# Patient Record
Sex: MALE | Race: ASIAN | NOT HISPANIC OR LATINO | Employment: FULL TIME | URBAN - METROPOLITAN AREA
[De-identification: names, ages, dates, MRNs, and addresses within clinical notes are randomized per-mention and may not be internally consistent; named-entity substitution may affect disease eponyms.]

---

## 2024-04-08 ENCOUNTER — OFFICE VISIT (OUTPATIENT)
Dept: INTERNAL MEDICINE CLINIC | Facility: CLINIC | Age: 54
End: 2024-04-08
Payer: COMMERCIAL

## 2024-04-08 VITALS
WEIGHT: 154 LBS | SYSTOLIC BLOOD PRESSURE: 104 MMHG | RESPIRATION RATE: 18 BRPM | HEART RATE: 67 BPM | DIASTOLIC BLOOD PRESSURE: 68 MMHG | BODY MASS INDEX: 27.29 KG/M2 | TEMPERATURE: 98.1 F | OXYGEN SATURATION: 99 % | HEIGHT: 63 IN

## 2024-04-08 DIAGNOSIS — E78.00 ELEVATED LDL CHOLESTEROL LEVEL: ICD-10-CM

## 2024-04-08 DIAGNOSIS — Z79.899 HIGH RISK MEDICATION USE: ICD-10-CM

## 2024-04-08 DIAGNOSIS — Z00.00 VISIT FOR PREVENTIVE HEALTH EXAMINATION: Primary | ICD-10-CM

## 2024-04-08 DIAGNOSIS — Z12.5 SCREENING PSA (PROSTATE SPECIFIC ANTIGEN): ICD-10-CM

## 2024-04-08 DIAGNOSIS — R79.89 LOW VITAMIN B12 LEVEL: ICD-10-CM

## 2024-04-08 DIAGNOSIS — E55.9 VITAMIN D DEFICIENCY: ICD-10-CM

## 2024-04-08 DIAGNOSIS — E11.9 TYPE 2 DIABETES MELLITUS WITHOUT COMPLICATION, WITHOUT LONG-TERM CURRENT USE OF INSULIN (HCC): ICD-10-CM

## 2024-04-08 PROCEDURE — 99213 OFFICE O/P EST LOW 20 MIN: CPT | Performed by: INTERNAL MEDICINE

## 2024-04-08 PROCEDURE — 99396 PREV VISIT EST AGE 40-64: CPT | Performed by: INTERNAL MEDICINE

## 2024-04-08 RX ORDER — ROSUVASTATIN CALCIUM 5 MG/1
5 TABLET, COATED ORAL EVERY OTHER DAY
COMMUNITY
End: 2024-04-08 | Stop reason: SDUPTHER

## 2024-04-08 RX ORDER — ROSUVASTATIN CALCIUM 5 MG/1
5 TABLET, COATED ORAL EVERY OTHER DAY
Qty: 45 TABLET | Refills: 3 | Status: SHIPPED | OUTPATIENT
Start: 2024-04-08

## 2024-04-08 RX ORDER — FAMOTIDINE 20 MG
1 TABLET ORAL
COMMUNITY

## 2024-04-08 NOTE — PATIENT INSTRUCTIONS
Patient was advised to continue present medications. discussed with the patient medications and laboratory data in detail.  Follow-up with me in 3 months or as advised.  If any blood test was ordered please do 1 week prior to next appointment unless advise to get earlier.  If you have any questions please call the office [phone]

## 2024-04-08 NOTE — PROGRESS NOTES
"Assessment/Plan:    1. Visit for preventive health examination  -     Comprehensive metabolic panel; Future  -     Lipid panel; Future  -     Hemoglobin A1C; Future  -     Albumin / creatinine urine ratio; Future  -     CK; Future    2. Type 2 diabetes mellitus without complication, without long-term current use of insulin (HCC)  Assessment & Plan:    No results found for: \"HGBA1C\"    Orders:  -     Comprehensive metabolic panel; Future  -     Lipid panel; Future  -     Hemoglobin A1C; Future  -     Albumin / creatinine urine ratio; Future    3. Vitamin D deficiency  -     Comprehensive metabolic panel; Future  -     Vitamin D 25 hydroxy; Future    4. Low vitamin B12 level    5. BMI 27.0-27.9,adult  Assessment & Plan:  Patient  was advised to decrease portion size.  Advised to decrease carb, sugar, cholesterol intake.  Advised to exercise 3-5 times per week.  Advised to lose weight.      6. Elevated LDL cholesterol level  -     rosuvastatin (CRESTOR) 5 mg tablet; Take 1 tablet (5 mg total) by mouth every other day  -     Lipid panel; Future  -     CK; Future    7. Screening PSA (prostate specific antigen)  -     PSA, Total Screen; Future    8. High risk medication use  -     CK; Future       Discussion/summary/plan:    He is diagnosed with diabetes about 4 years ago.  He takes  Vilgagliptin 50 mg with metformin 500 mg tablet 1 tablet daily.  Last hemoglobin A1c was 6.1 January 13, 2024 when he had a blood test in [facility].  Advised to continue present medication will follow blood sugar hemoglobin A1c.  Advised to continue 1800-calorie low sugar low-carb diet.  He was also diagnosed with vitamin D deficiency when he had a blood test January 13, 2024 in [facility] his vitamin D level was 15.64 he has been taking vitamin D 1000 IU daily.  Advised to take vitamin D 2000 IU daily and will follow vitamin D level.  His vitamin B-12 level was 284 advised to start vitamin B12 500 mcg once a day.  Will give vitamin B-12 at least 400 " and above.  Will follow vitamin B12 level.  He had a blood test for the lipid panel January 13 2024 while he was in [place] and is cholesterol is 157, triglyceride 162, HDL 44, .  Due to elevated LDL and history of diabetes mellitus I advised to start rosuvastatin 5 mg every other day for now.  Advised for low-cholesterol low-carb diet.  Will follow lipid panel.  Patient said he had eye examination while he was in [place] about 3 months ago  Discussed with the patient about getting colonoscopy but he would like to wait.    Subjective: Patient presents for preventive health examination as well as to establish primary care physician.      Patient ID: Claudine Tony is a 53 y.o. male.    HPI  53-year-old male patient first time came to see me to establish primary care physician.  Recently came from [place].  He denies any chest pain, shortness of breath, pain in abdomen.  Denies any cough, fever, chills.  Denies any nausea vomiting diarrhea or constipation.  He brought medication with him which she takes for a diabetes mellitus.    The following portions of the patient's history were reviewed and updated as appropriate:     Past Medical History:  He has a past medical history of BMI 27.0-27.9,adult (04/08/2024), Elevated LDL cholesterol level (04/08/2024), Low vitamin B12 level (04/08/2024), Type 2 diabetes mellitus without complication, without long-term current use of insulin (HCC) (04/08/2024), Visit for preventive health examination (04/08/2024), and Vitamin D deficiency (04/08/2024).,  _______________________________________________________________________  Past Surgical History:   has no past surgical history on file.,  _______________________________________________________________________  Family History:  family history is not on file.,  _______________________________________________________________________  Social History:   reports that he has never smoked. He has never used smokeless tobacco. He  reports that he does not currently use alcohol. He reports that he does not currently use drugs.,  _______________________________________________________________________  Allergies:  has No Known Allergies..  _______________________________________________________________________  Current Outpatient Medications   Medication Sig Dispense Refill    metFORMIN (GLUCOPHAGE) 500 mg tablet Take 500 mg by mouth in the morning      NON FORMULARY Take 50 mg by mouth daily Vildagliptin      rosuvastatin (CRESTOR) 5 mg tablet Take 1 tablet (5 mg total) by mouth every other day 45 tablet 3    Vitamin D, Cholecalciferol, 25 MCG (1000 UT) CAPS Take 1 tablet by mouth Daily at 2am       No current facility-administered medications for this visit.     _______________________________________________________________________  Review of Systems   Constitutional:  Negative for chills and fever.   HENT:  Negative for congestion, ear pain, hearing loss, nosebleeds, sinus pain, sore throat and trouble swallowing.    Eyes:  Negative for discharge, redness and visual disturbance.   Respiratory:  Negative for cough, chest tightness and shortness of breath.    Cardiovascular:  Negative for chest pain and palpitations.   Gastrointestinal:  Negative for abdominal pain, blood in stool, constipation, diarrhea, nausea and vomiting.   Genitourinary:  Negative for dysuria, flank pain and hematuria.   Musculoskeletal:  Negative for arthralgias, myalgias and neck pain.   Skin:  Negative for color change and rash.   Neurological:  Negative for dizziness, speech difficulty, weakness and headaches.   Hematological:  Does not bruise/bleed easily.   Psychiatric/Behavioral:  Negative for agitation and behavioral problems.            Objective:  Vitals:    04/08/24 1118   BP: 104/68   BP Location: Left arm   Patient Position: Sitting   Cuff Size: Standard   Pulse: 67   Resp: 18   Temp: 98.1 °F (36.7 °C)   TempSrc: Temporal   SpO2: 99%   Weight: 69.9 kg (154  "lb)   Height: 5' 3\" (1.6 m)     Body mass index is 27.28 kg/m².     Physical Exam  Vitals and nursing note reviewed.   Constitutional:       General: He is not in acute distress.     Appearance: Normal appearance.   HENT:      Head: Normocephalic and atraumatic.      Right Ear: Tympanic membrane, ear canal and external ear normal. There is no impacted cerumen.      Left Ear: Tympanic membrane, ear canal and external ear normal. There is no impacted cerumen.      Nose: Nose normal. No congestion.      Mouth/Throat:      Mouth: Mucous membranes are moist.      Pharynx: Oropharynx is clear. No oropharyngeal exudate or posterior oropharyngeal erythema.      Comments: He has all implanted teeth.  Eyes:      General: No scleral icterus.        Right eye: No discharge.         Left eye: No discharge.      Extraocular Movements: Extraocular movements intact.      Conjunctiva/sclera: Conjunctivae normal.   Cardiovascular:      Rate and Rhythm: Normal rate and regular rhythm.      Pulses: Normal pulses.      Heart sounds: Normal heart sounds. No murmur heard.  Pulmonary:      Effort: Pulmonary effort is normal. No respiratory distress.      Breath sounds: Normal breath sounds. No wheezing, rhonchi or rales.   Abdominal:      General: Bowel sounds are normal. There is no distension.      Palpations: Abdomen is soft.      Tenderness: There is no abdominal tenderness.   Musculoskeletal:         General: Normal range of motion.      Cervical back: Normal range of motion and neck supple. No muscular tenderness.      Right lower leg: No edema.      Left lower leg: No edema.   Skin:     General: Skin is warm.      Findings: No rash.   Neurological:      General: No focal deficit present.      Mental Status: He is alert and oriented to person, place, and time.      Motor: No weakness.      Coordination: Coordination normal.   Psychiatric:         Mood and Affect: Mood normal.         Behavior: Behavior normal.           "

## 2024-05-07 DIAGNOSIS — E78.00 ELEVATED LDL CHOLESTEROL LEVEL: ICD-10-CM

## 2024-05-07 RX ORDER — ROSUVASTATIN CALCIUM 5 MG/1
5 TABLET, COATED ORAL EVERY OTHER DAY
Qty: 45 TABLET | Refills: 3 | Status: SHIPPED | OUTPATIENT
Start: 2024-05-07

## 2024-07-12 LAB
25(OH)D3+25(OH)D2 SERPL-MCNC: 24 NG/ML (ref 30–100)
ALBUMIN SERPL-MCNC: 4.1 G/DL (ref 3.5–5.7)
ALBUMIN/CREAT UR: ABNORMAL
ALP SERPL-CCNC: 87 U/L (ref 35–120)
ALT SERPL-CCNC: 15 U/L
ANION GAP SERPL CALCULATED.3IONS-SCNC: 8 MMOL/L (ref 3–11)
AST SERPL-CCNC: 15 U/L
BILIRUB SERPL-MCNC: 0.5 MG/DL (ref 0.2–1)
BUN SERPL-MCNC: 6 MG/DL (ref 7–28)
CALCIUM SERPL-MCNC: 9.4 MG/DL (ref 8.5–10.1)
CHLORIDE SERPL-SCNC: 104 MMOL/L (ref 100–109)
CHOLEST SERPL-MCNC: 104 MG/DL
CHOLEST/HDLC SERPL: 3.2 {RATIO}
CK SERPL-CCNC: 57 U/L (ref 30–223)
CO2 SERPL-SCNC: 28 MMOL/L (ref 21–31)
CREAT SERPL-MCNC: 0.69 MG/DL (ref 0.53–1.3)
CREAT UR-MCNC: 47.9 MG/DL (ref 50–200)
CYTOLOGY CMNT CVX/VAG CYTO-IMP: ABNORMAL
EST. AVERAGE GLUCOSE BLD GHB EST-MCNC: 128 MG/DL
GFR/BSA.PRED SERPLBLD CYS-BASED-ARV: 111 ML/MIN/{1.73_M2}
GLUCOSE SERPL-MCNC: 91 MG/DL (ref 65–99)
HBA1C MFR BLD: 6.1 %
HDLC SERPL-MCNC: 33 MG/DL (ref 23–92)
LDLC SERPL CALC-MCNC: 47 MG/DL
MICROALBUMIN UR-MCNC: <0.7 MG/DL
NONHDLC SERPL-MCNC: 71 MG/DL
POTASSIUM SERPL-SCNC: 4.5 MMOL/L (ref 3.5–5.2)
PROT SERPL-MCNC: 6.6 G/DL (ref 6.3–8.3)
PSA SERPL-MCNC: 0.59 NG/ML
SODIUM SERPL-SCNC: 140 MMOL/L (ref 135–145)
TRIGL SERPL-MCNC: 122 MG/DL

## 2024-07-16 ENCOUNTER — OFFICE VISIT (OUTPATIENT)
Dept: INTERNAL MEDICINE CLINIC | Facility: CLINIC | Age: 54
End: 2024-07-16
Payer: COMMERCIAL

## 2024-07-16 VITALS
HEART RATE: 83 BPM | RESPIRATION RATE: 18 BRPM | SYSTOLIC BLOOD PRESSURE: 110 MMHG | WEIGHT: 144 LBS | DIASTOLIC BLOOD PRESSURE: 60 MMHG | TEMPERATURE: 98.3 F | OXYGEN SATURATION: 98 % | HEIGHT: 63 IN | BODY MASS INDEX: 25.52 KG/M2

## 2024-07-16 DIAGNOSIS — E11.9 TYPE 2 DIABETES MELLITUS WITHOUT COMPLICATION, WITHOUT LONG-TERM CURRENT USE OF INSULIN (HCC): Primary | ICD-10-CM

## 2024-07-16 DIAGNOSIS — E55.9 VITAMIN D DEFICIENCY: ICD-10-CM

## 2024-07-16 DIAGNOSIS — M54.9 UPPER BACK PAIN ON RIGHT SIDE: ICD-10-CM

## 2024-07-16 DIAGNOSIS — E78.00 ELEVATED LDL CHOLESTEROL LEVEL: ICD-10-CM

## 2024-07-16 DIAGNOSIS — Z79.899 HIGH RISK MEDICATION USE: ICD-10-CM

## 2024-07-16 PROCEDURE — 99213 OFFICE O/P EST LOW 20 MIN: CPT | Performed by: INTERNAL MEDICINE

## 2024-07-16 PROCEDURE — 93000 ELECTROCARDIOGRAM COMPLETE: CPT | Performed by: INTERNAL MEDICINE

## 2024-07-16 RX ORDER — ROSUVASTATIN CALCIUM 5 MG/1
5 TABLET, COATED ORAL EVERY OTHER DAY
Qty: 45 TABLET | Refills: 3 | Status: SHIPPED | OUTPATIENT
Start: 2024-07-16

## 2024-07-16 NOTE — PATIENT INSTRUCTIONS
Patient was advised to continue present medications. discussed with the patient medications and laboratory data in detail.  Follow-up with me in 3 months or as advised.  If any blood test was ordered please do 1 week prior to next appointment unless advise to get earlier.  If you have any questions please call the office [phone]

## 2024-07-16 NOTE — PROGRESS NOTES
Assessment/Plan:    1. Type 2 diabetes mellitus without complication, without long-term current use of insulin (HCC)  -     POCT ECG  -     Lipid panel; Future  -     Comprehensive metabolic panel; Future  -     Hemoglobin A1C; Future  -     CBC and differential; Future  2. Elevated LDL cholesterol level  -     rosuvastatin (CRESTOR) 5 mg tablet; Take 1 tablet (5 mg total) by mouth every other day  -     POCT ECG  -     Lipid panel; Future  -     Comprehensive metabolic panel; Future  3. Vitamin D deficiency  -     Comprehensive metabolic panel; Future  -     Vitamin D 25 hydroxy; Future  -     CBC and differential; Future  4. BMI 25.0-25.9,adult  5. Upper back pain on right side  -     POCT ECG  -     CBC and differential; Future  6. High risk medication use  -     Comprehensive metabolic panel; Future     Discussions/summary/plan:    Diabetes mellitus well-controlled.  Hemoglobin A1c 6.1.  No hypoglycemia.  Advised to continue present medication and 1800-calorie low sugar low-carb diet.  Will follow blood sugar hemoglobin A1c.  Last eye examination was January 2024 while he was in [place].  His LDL significantly improved it was 108 on diet after being on rosuvastatin 5 mg every other day it decreased to 47 advised to continue same dose of rosuvastatin and low-cholesterol low sugar low carbs diet.  For vitamin D deficiency supposed to take vitamin D daily but he is not taking last vitamin D level was 24 advised to take vitamin D 2000 IU daily and will follow vitamin D level.  Discussed with the patient consequences of vitamin D deficiency.  He developed right upper back pain on the scapular area few days ago.  Denies any fall or injury.  At work he does require side-to-side movement of the upper body.  On exam no skin rash EKG was done in the office today revealed normal sinus rhythm no ST changes no arrhythmia.  Heart rate 76/min.  Most likely musculoskeletal has some mild focal tenderness.  Advised to use Bengay  and may take Aleve 1 tablet p.o. twice daily PC for 7 days if not better or get worse patient to call.    Subjective: Patient presents for follow-up.      Patient ID: Claudine Tony is a 54 y.o. male.    HPI  54-year-old male patient presents to follow-up his medical problems.  He denies any chest pain, shortness of breath, pain in abdomen.  Denies any cough, fever, chills.  Denies nausea vomiting diarrhea.  Complain of right upper back pain for last few days without any injury or fall.    The following portions of the patient's history were reviewed and updated as appropriate:     Past Medical History:  He has a past medical history of BMI 27.0-27.9,adult (04/08/2024), Elevated LDL cholesterol level (04/08/2024), Low vitamin B12 level (04/08/2024), Type 2 diabetes mellitus without complication, without long-term current use of insulin (HCC) (04/08/2024), Upper back pain on right side (07/16/2024), Visit for preventive health examination (04/08/2024), and Vitamin D deficiency (04/08/2024).,  _______________________________________________________________________  Past Surgical History:   has no past surgical history on file.,  _______________________________________________________________________  Family History:  family history is not on file.,  _______________________________________________________________________  Social History:   reports that he has never smoked. He has never used smokeless tobacco. He reports that he does not currently use alcohol. He reports that he does not currently use drugs.,  _______________________________________________________________________  Allergies:  has No Known Allergies..  _______________________________________________________________________  Current Outpatient Medications   Medication Sig Dispense Refill   • metFORMIN (GLUCOPHAGE) 500 mg tablet Take 500 mg by mouth in the morning     • NON FORMULARY Take 50 mg by mouth daily Vildagliptin     • rosuvastatin (CRESTOR)  "5 mg tablet Take 1 tablet (5 mg total) by mouth every other day 45 tablet 3   • Vitamin D, Cholecalciferol, 25 MCG (1000 UT) CAPS Take 1 tablet by mouth Daily at 2am       No current facility-administered medications for this visit.     _______________________________________________________________________  Review of Systems   Constitutional:  Negative for chills and fever.   HENT:  Negative for congestion, ear pain, hearing loss, nosebleeds, sinus pain, sore throat and trouble swallowing.    Eyes:  Negative for discharge, redness and visual disturbance.   Respiratory:  Negative for cough, chest tightness and shortness of breath.    Cardiovascular:  Negative for chest pain and palpitations.   Gastrointestinal:  Negative for abdominal pain, blood in stool, constipation, diarrhea, nausea and vomiting.   Genitourinary:  Negative for dysuria, flank pain and hematuria.   Musculoskeletal:  Positive for back pain (Right-sided upper back pain). Negative for arthralgias, myalgias and neck pain.   Skin:  Negative for color change and rash.   Neurological:  Negative for dizziness, speech difficulty, weakness, numbness and headaches.   Hematological:  Does not bruise/bleed easily.   Psychiatric/Behavioral:  Negative for agitation and behavioral problems.          Objective:  Vitals:    07/16/24 1019   BP: 110/60   BP Location: Left arm   Patient Position: Sitting   Cuff Size: Standard   Pulse: 83   Resp: 18   Temp: 98.3 °F (36.8 °C)   TempSrc: Temporal   SpO2: 98%   Weight: 65.3 kg (144 lb)   Height: 5' 3\" (1.6 m)     Body mass index is 25.51 kg/m².     Physical Exam  Vitals and nursing note reviewed.   Constitutional:       General: He is not in acute distress.     Appearance: Normal appearance.   HENT:      Head: Normocephalic and atraumatic.      Right Ear: Ear canal and external ear normal.      Left Ear: Ear canal and external ear normal.      Nose: Nose normal.      Mouth/Throat:      Mouth: Mucous membranes are moist. "   Eyes:      General: No scleral icterus.        Right eye: No discharge.         Left eye: No discharge.      Extraocular Movements: Extraocular movements intact.      Conjunctiva/sclera: Conjunctivae normal.   Cardiovascular:      Rate and Rhythm: Normal rate and regular rhythm.      Pulses: Normal pulses.      Heart sounds: No murmur heard.  Pulmonary:      Effort: Pulmonary effort is normal. No respiratory distress.      Breath sounds: Normal breath sounds.   Abdominal:      General: Bowel sounds are normal.      Palpations: Abdomen is soft.      Tenderness: There is no abdominal tenderness.   Musculoskeletal:         General: Tenderness (Tender on the right scapular area.  No gross swelling or rash noted.) present. Normal range of motion.      Cervical back: Normal range of motion and neck supple. No muscular tenderness.      Right lower leg: No edema.      Left lower leg: No edema.   Skin:     General: Skin is warm.      Findings: No rash.   Neurological:      General: No focal deficit present.      Mental Status: He is alert and oriented to person, place, and time.      Motor: No weakness.      Coordination: Coordination normal.   Psychiatric:         Mood and Affect: Mood normal.         Behavior: Behavior normal.

## 2025-01-13 LAB
25(OH)D3+25(OH)D2 SERPL-MCNC: 21 NG/ML (ref 30–100)
ALBUMIN SERPL-MCNC: 4.2 G/DL (ref 3.5–5.7)
ALP SERPL-CCNC: 82 U/L (ref 35–120)
ALT SERPL-CCNC: 15 U/L
ANION GAP SERPL CALCULATED.3IONS-SCNC: 8 MMOL/L (ref 3–11)
AST SERPL-CCNC: 16 U/L
BASOPHILS # BLD AUTO: 0.1 THOU/CMM (ref 0–0.1)
BASOPHILS NFR BLD AUTO: 1 %
BILIRUB SERPL-MCNC: 0.5 MG/DL (ref 0.2–1)
BUN SERPL-MCNC: 9 MG/DL (ref 7–28)
CALCIUM SERPL-MCNC: 9.6 MG/DL (ref 8.5–10.5)
CHLORIDE SERPL-SCNC: 105 MMOL/L (ref 100–109)
CHOLEST SERPL-MCNC: 132 MG/DL
CHOLEST/HDLC SERPL: 3.1 {RATIO}
CO2 SERPL-SCNC: 29 MMOL/L (ref 21–31)
CREAT SERPL-MCNC: 0.69 MG/DL (ref 0.53–1.3)
CYTOLOGY CMNT CVX/VAG CYTO-IMP: NORMAL
DIFFERENTIAL METHOD BLD: NORMAL
EOSINOPHIL # BLD AUTO: 0.2 THOU/CMM (ref 0–0.5)
EOSINOPHIL NFR BLD AUTO: 3 %
ERYTHROCYTE [DISTWIDTH] IN BLOOD BY AUTOMATED COUNT: 12.9 % (ref 12–16)
EST. AVERAGE GLUCOSE BLD GHB EST-MCNC: 131 MG/DL
GFR/BSA.PRED SERPLBLD CYS-BASED-ARV: 111 ML/MIN/{1.73_M2}
GLUCOSE SERPL-MCNC: 91 MG/DL (ref 65–99)
HBA1C MFR BLD: 6.2 %
HCT VFR BLD AUTO: 40.3 % (ref 37–48)
HDLC SERPL-MCNC: 43 MG/DL (ref 23–92)
HGB BLD-MCNC: 13.5 G/DL (ref 12.5–17)
LDLC SERPL CALC-MCNC: 74 MG/DL
LYMPHOCYTES # BLD AUTO: 2.1 THOU/CMM (ref 1–3)
LYMPHOCYTES NFR BLD AUTO: 40 %
MCH RBC QN AUTO: 31.2 PG (ref 27–36)
MCHC RBC AUTO-ENTMCNC: 33.6 G/DL (ref 32–37)
MCV RBC AUTO: 93 FL (ref 80–100)
MONOCYTES # BLD AUTO: 0.4 THOU/CMM (ref 0.3–1)
MONOCYTES NFR BLD AUTO: 8 %
NEUTROPHILS # BLD AUTO: 2.5 THOU/CMM (ref 1.8–7.8)
NEUTROPHILS NFR BLD AUTO: 48 %
NONHDLC SERPL-MCNC: 89 MG/DL
PLATELET # BLD AUTO: 206 THOU/CMM (ref 140–350)
PMV BLD REES-ECKER: 8.9 FL (ref 7.5–11.3)
POTASSIUM SERPL-SCNC: 4.8 MMOL/L (ref 3.5–5.2)
PROT SERPL-MCNC: 7 G/DL (ref 6.3–8.3)
RBC # BLD AUTO: 4.34 MILL/CMM (ref 4–5.4)
SODIUM SERPL-SCNC: 142 MMOL/L (ref 135–145)
TRIGL SERPL-MCNC: 77 MG/DL
WBC # BLD AUTO: 5.3 THOU/CMM (ref 4–10.5)

## 2025-01-16 ENCOUNTER — OFFICE VISIT (OUTPATIENT)
Dept: INTERNAL MEDICINE CLINIC | Facility: CLINIC | Age: 55
End: 2025-01-16
Payer: COMMERCIAL

## 2025-01-16 VITALS
HEIGHT: 63 IN | TEMPERATURE: 98.4 F | OXYGEN SATURATION: 99 % | WEIGHT: 142 LBS | BODY MASS INDEX: 25.16 KG/M2 | HEART RATE: 72 BPM | SYSTOLIC BLOOD PRESSURE: 120 MMHG | DIASTOLIC BLOOD PRESSURE: 70 MMHG

## 2025-01-16 DIAGNOSIS — E53.8 VITAMIN B12 DEFICIENCY: ICD-10-CM

## 2025-01-16 DIAGNOSIS — Z79.899 HIGH RISK MEDICATION USE: ICD-10-CM

## 2025-01-16 DIAGNOSIS — E11.9 TYPE 2 DIABETES MELLITUS WITHOUT COMPLICATION, WITHOUT LONG-TERM CURRENT USE OF INSULIN (HCC): Primary | ICD-10-CM

## 2025-01-16 DIAGNOSIS — E55.9 VITAMIN D DEFICIENCY: ICD-10-CM

## 2025-01-16 DIAGNOSIS — E78.00 ELEVATED LDL CHOLESTEROL LEVEL: ICD-10-CM

## 2025-01-16 PROCEDURE — 99213 OFFICE O/P EST LOW 20 MIN: CPT | Performed by: INTERNAL MEDICINE

## 2025-01-16 RX ORDER — ROSUVASTATIN CALCIUM 5 MG/1
5 TABLET, COATED ORAL EVERY OTHER DAY
Qty: 45 TABLET | Refills: 3 | Status: SHIPPED | OUTPATIENT
Start: 2025-01-16

## 2025-01-16 RX ORDER — ROSUVASTATIN CALCIUM 5 MG/1
5 TABLET, COATED ORAL EVERY OTHER DAY
Qty: 45 TABLET | Refills: 3 | Status: SHIPPED | OUTPATIENT
Start: 2025-01-16 | End: 2025-01-16 | Stop reason: CLARIF

## 2025-01-16 RX ORDER — ROSUVASTATIN CALCIUM 5 MG/1
5 TABLET, COATED ORAL EVERY OTHER DAY
COMMUNITY
End: 2025-01-16 | Stop reason: SDUPTHER

## 2025-01-16 NOTE — ASSESSMENT & PLAN NOTE
Diabetes mellitus is well-controlled.  Hemoglobin A1c 6.2 advised to continue present metformin and 1800-calorie low sugar low carbs diet.  Advised to see an eye doctor.  Follow blood sugar hemoglobin A1c.  Lab Results   Component Value Date    HGBA1C 6.2 (H) 01/13/2025     Orders:  •  rosuvastatin (CRESTOR) 5 mg tablet; Take 1 tablet (5 mg total) by mouth every other day  •  Comprehensive metabolic panel; Future  •  Lipid panel; Future  •  Albumin / creatinine urine ratio; Future  •  UA (URINE) with reflex to Scope; Future  •  Hemoglobin A1C; Future

## 2025-01-16 NOTE — PATIENT INSTRUCTIONS
Patient was advised to continue present medications. discussed with the patient medications and laboratory data in detail.  Follow-up with me in 6 months or as advised.  If any blood test was ordered please do 1 week prior to next appointment unless advise to get earlier.  If you have any questions please call the office [phone]

## 2025-01-16 NOTE — ASSESSMENT & PLAN NOTE
Well-controlled on rosuvastatin 5 mg every other day.  Cholesterol 132, triglyceride 77, HDL 43, LDL 74 advised to continue present medication and low-cholesterol low-carb diet will follow lipid panel  Orders:  •  rosuvastatin (CRESTOR) 5 mg tablet; Take 1 tablet (5 mg total) by mouth every other day  •  Comprehensive metabolic panel; Future  •  Lipid panel; Future

## 2025-01-16 NOTE — ASSESSMENT & PLAN NOTE
He was getting vitamin B12 injection while he was in [place] for vitamin B12 deficiency.  Presently is not taking vitamin B-12 supplement.  Will check vitamin B12 level and advise accordingly.  Orders:  •  Vitamin B12; Future

## 2025-01-16 NOTE — PROGRESS NOTES
Name: Claudine Tony      : 1970      MRN: [id number]  Encounter Provider: Coleman Hodges MD  Encounter Date: 2025   Encounter department: [facility]  :  Assessment & Plan  Type 2 diabetes mellitus without complication, without long-term current use of insulin (HCC)  Diabetes mellitus is well-controlled.  Hemoglobin A1c 6.2 advised to continue present metformin and 1800-calorie low sugar low carbs diet.  Advised to see an eye doctor.  Follow blood sugar hemoglobin A1c.  Lab Results   Component Value Date    HGBA1C 6.2 (H) 2025     Orders:  •  rosuvastatin (CRESTOR) 5 mg tablet; Take 1 tablet (5 mg total) by mouth every other day  •  Comprehensive metabolic panel; Future  •  Lipid panel; Future  •  Albumin / creatinine urine ratio; Future  •  UA (URINE) with reflex to Scope; Future  •  Hemoglobin A1C; Future    Vitamin D deficiency  His vitamin D level is low 21 he is supposed take vitamin D 2000 IU daily but he has not taking regularly.  Advised to take vitamin D 2000 IU daily.  Will follow vitamin D level.  Orders:  •  Comprehensive metabolic panel; Future  •  Vitamin D 25 hydroxy; Future    BMI 25.0-25.9,adult  Advised to watch diet, decrease calorie intake and exercise to lose weight       Elevated LDL cholesterol level  Well-controlled on rosuvastatin 5 mg every other day.  Cholesterol 132, triglyceride 77, HDL 43, LDL 74 advised to continue present medication and low-cholesterol low-carb diet will follow lipid panel  Orders:  •  rosuvastatin (CRESTOR) 5 mg tablet; Take 1 tablet (5 mg total) by mouth every other day  •  Comprehensive metabolic panel; Future  •  Lipid panel; Future    Vitamin B12 deficiency  He was getting vitamin B12 injection while he was in [place] for vitamin B12 deficiency.  Presently is not taking vitamin B-12 supplement.  Will check vitamin B12 level and advise accordingly.  Orders:  •  Vitamin B12; Future    High risk  medication use    Orders:  •  Comprehensive metabolic panel; Future           History of Present Illness     HPI  54-year-old male patient presents for follow-up his medical problems.      Current Outpatient Medications:   •  rosuvastatin (CRESTOR) 5 mg tablet, Take 1 tablet (5 mg total) by mouth every other day, Disp: 45 tablet, Rfl: 3  •  metFORMIN (GLUCOPHAGE) 500 mg tablet, Take 500 mg by mouth in the morning, Disp: , Rfl:   •  NON FORMULARY, Take 50 mg by mouth daily Vildagliptin, Disp: , Rfl:   •  Vitamin D, Cholecalciferol, 25 MCG (1000 UT) CAPS, Take 2 tablets by mouth Daily at 2am, Disp: , Rfl:      Past Medical History:   Diagnosis Date   • BMI 27.0-27.9,adult 04/08/2024   • Elevated LDL cholesterol level 04/08/2024   • Low vitamin B12 level 04/08/2024   • Type 2 diabetes mellitus without complication, without long-term current use of insulin (HCC) 04/08/2024   • Upper back pain on right side 07/16/2024   • Visit for preventive health examination 04/08/2024   • Vitamin B12 deficiency 01/16/2025   • Vitamin D deficiency 04/08/2024      Review of Systems   Constitutional:  Negative for chills, fatigue and fever.   HENT:  Negative for congestion, ear pain, hearing loss, nosebleeds, sinus pain, sore throat and trouble swallowing.    Eyes:  Negative for discharge, redness and visual disturbance.   Respiratory:  Negative for cough, chest tightness and shortness of breath.    Cardiovascular:  Negative for chest pain and palpitations.   Gastrointestinal:  Negative for abdominal pain, blood in stool, constipation, diarrhea, nausea and vomiting.   Genitourinary:  Negative for dysuria, flank pain, frequency and hematuria.   Musculoskeletal:  Negative for arthralgias, myalgias and neck pain.   Skin:  Negative for color change and rash.   Neurological:  Negative for dizziness, speech difficulty, weakness, numbness and headaches.   Hematological:  Does not bruise/bleed easily.   Psychiatric/Behavioral:  Negative for  "agitation and behavioral problems.          Objective   /70   Pulse 72   Temp 98.4 °F (36.9 °C)   Ht 5' 3\" (1.6 m)   Wt 64.4 kg (142 lb)   SpO2 99%   BMI 25.15 kg/m²      Physical Exam  Vitals and nursing note reviewed.   Constitutional:       General: He is not in acute distress.     Appearance: Normal appearance. He is well-developed.   HENT:      Head: Normocephalic and atraumatic.      Right Ear: External ear normal.      Left Ear: External ear normal.      Nose: Nose normal.      Mouth/Throat:      Mouth: Mucous membranes are moist.      Pharynx: Oropharynx is clear.   Eyes:      General: No scleral icterus.        Right eye: No discharge.         Left eye: No discharge.      Extraocular Movements: Extraocular movements intact.      Conjunctiva/sclera: Conjunctivae normal.   Cardiovascular:      Rate and Rhythm: Normal rate and regular rhythm.      Pulses: Normal pulses.      Heart sounds: Normal heart sounds. No murmur heard.  Pulmonary:      Effort: Pulmonary effort is normal. No respiratory distress.      Breath sounds: Normal breath sounds. No wheezing, rhonchi or rales.   Abdominal:      General: Bowel sounds are normal. There is no distension.      Palpations: Abdomen is soft.      Tenderness: There is no abdominal tenderness.   Musculoskeletal:         General: No swelling. Normal range of motion.      Cervical back: Normal range of motion and neck supple.      Right lower leg: No edema.      Left lower leg: No edema.   Skin:     General: Skin is warm and dry.      Capillary Refill: Capillary refill takes less than 2 seconds.      Findings: No rash.   Neurological:      General: No focal deficit present.      Mental Status: He is alert and oriented to person, place, and time.      Motor: No weakness.      Coordination: Coordination normal.   Psychiatric:         Mood and Affect: Mood normal.         Behavior: Behavior normal.         Thought Content: Thought content normal.         Judgment: " Judgment normal.

## 2025-01-16 NOTE — ASSESSMENT & PLAN NOTE
His vitamin D level is low 21 he is supposed take vitamin D 2000 IU daily but he has not taking regularly.  Advised to take vitamin D 2000 IU daily.  Will follow vitamin D level.  Orders:  •  Comprehensive metabolic panel; Future  •  Vitamin D 25 hydroxy; Future

## 2025-02-03 DIAGNOSIS — E78.00 ELEVATED LDL CHOLESTEROL LEVEL: ICD-10-CM

## 2025-02-03 DIAGNOSIS — E11.9 TYPE 2 DIABETES MELLITUS WITHOUT COMPLICATION, WITHOUT LONG-TERM CURRENT USE OF INSULIN (HCC): ICD-10-CM

## 2025-02-03 RX ORDER — ROSUVASTATIN CALCIUM 5 MG/1
5 TABLET, COATED ORAL EVERY OTHER DAY
Qty: 45 TABLET | Refills: 3 | Status: SHIPPED | OUTPATIENT
Start: 2025-02-03

## 2025-05-07 ENCOUNTER — TELEPHONE (OUTPATIENT)
Dept: INTERNAL MEDICINE CLINIC | Facility: CLINIC | Age: 55
End: 2025-05-07

## 2025-05-07 NOTE — TELEPHONE ENCOUNTER
Patient called refill line requesting to be transferred to office. I asked if it was in regard to a refill which he stated it was. I told him I would be happy to help him with that and then he stated it was in regard to results and wanted to be transferred to office. I asked what results would it be in regards to so I can let the clinical team know when I transfer and he stated it was something else. Patient disconnected the call.    Please contact patient at [phone]

## 2025-07-01 LAB
25(OH)D3+25(OH)D2 SERPL-MCNC: 35 NG/ML (ref 30–100)
ALBUMIN SERPL-MCNC: 4.4 G/DL (ref 3.5–5.7)
ALBUMIN/CREAT UR: NORMAL
ALP SERPL-CCNC: 90 U/L (ref 35–120)
ALT SERPL-CCNC: 13 U/L
ANION GAP SERPL CALCULATED.3IONS-SCNC: 6 MMOL/L (ref 3–11)
AST SERPL-CCNC: 14 U/L
BILIRUB SERPL-MCNC: 0.6 MG/DL (ref 0.2–1)
BUN SERPL-MCNC: 9 MG/DL (ref 7–28)
CALCIUM SERPL-MCNC: 9.6 MG/DL (ref 8.5–10.5)
CHLORIDE SERPL-SCNC: 104 MMOL/L (ref 100–109)
CHOLEST SERPL-MCNC: 115 MG/DL
CHOLEST/HDLC SERPL: 2.6 {RATIO}
CO2 SERPL-SCNC: 29 MMOL/L (ref 21–31)
CREAT SERPL-MCNC: 0.69 MG/DL (ref 0.53–1.3)
CREAT UR-MCNC: 51.1 MG/DL (ref 50–200)
CYTOLOGY CMNT CVX/VAG CYTO-IMP: NORMAL
EST. AVERAGE GLUCOSE BLD GHB EST-MCNC: 131 MG/DL
GFR/BSA.PRED SERPLBLD CYS-BASED-ARV: 110 ML/MIN/{1.73_M2}
GLUCOSE SERPL-MCNC: 89 MG/DL (ref 65–99)
GLUCOSE UR QL STRIP: NEGATIVE MG/DL
HBA1C MFR BLD: 6.2 %
HDLC SERPL-MCNC: 44 MG/DL (ref 23–92)
HGB UR QL STRIP: NEGATIVE MG/DL
KETONES UR QL STRIP: NEGATIVE MG/DL
LDLC SERPL CALC-MCNC: 54 MG/DL
LEUKOCYTE ESTERASE UR QL STRIP: NEGATIVE /UL
MICROALBUMIN UR-MCNC: <0.7 MG/DL
NITRITE UR QL STRIP: NEGATIVE
NONHDLC SERPL-MCNC: 71 MG/DL
PH UR: 6 [PH] (ref 4.5–8)
POTASSIUM SERPL-SCNC: 4.4 MMOL/L (ref 3.5–5.2)
PROT 24H UR-MRATE: NEGATIVE MG/DL
PROT SERPL-MCNC: 7 G/DL (ref 6.3–8.3)
SL AMB POCT URINE COMMENT: NORMAL
SODIUM SERPL-SCNC: 139 MMOL/L (ref 135–145)
SP GR UR: 1.01 (ref 1–1.03)
TRIGL SERPL-MCNC: 84 MG/DL
VIT B12 SERPL-MCNC: 525 PG/ML (ref 180–914)

## 2025-07-09 ENCOUNTER — OFFICE VISIT (OUTPATIENT)
Dept: INTERNAL MEDICINE CLINIC | Facility: CLINIC | Age: 55
End: 2025-07-09
Payer: COMMERCIAL

## 2025-07-09 VITALS
OXYGEN SATURATION: 99 % | DIASTOLIC BLOOD PRESSURE: 64 MMHG | RESPIRATION RATE: 16 BRPM | HEART RATE: 79 BPM | BODY MASS INDEX: 24.98 KG/M2 | SYSTOLIC BLOOD PRESSURE: 114 MMHG | WEIGHT: 141 LBS | TEMPERATURE: 98 F | HEIGHT: 63 IN

## 2025-07-09 DIAGNOSIS — Z12.11 SCREENING FOR COLON CANCER: ICD-10-CM

## 2025-07-09 DIAGNOSIS — E53.8 VITAMIN B12 DEFICIENCY: ICD-10-CM

## 2025-07-09 DIAGNOSIS — E11.9 TYPE 2 DIABETES MELLITUS WITHOUT COMPLICATION, WITHOUT LONG-TERM CURRENT USE OF INSULIN (HCC): ICD-10-CM

## 2025-07-09 DIAGNOSIS — E55.9 VITAMIN D DEFICIENCY: ICD-10-CM

## 2025-07-09 DIAGNOSIS — Z79.899 HIGH RISK MEDICATION USE: ICD-10-CM

## 2025-07-09 DIAGNOSIS — E78.00 ELEVATED LDL CHOLESTEROL LEVEL: ICD-10-CM

## 2025-07-09 DIAGNOSIS — Z00.00 ANNUAL PHYSICAL EXAM: Primary | ICD-10-CM

## 2025-07-09 DIAGNOSIS — Z12.5 SCREENING PSA (PROSTATE SPECIFIC ANTIGEN): ICD-10-CM

## 2025-07-09 PROCEDURE — 99396 PREV VISIT EST AGE 40-64: CPT | Performed by: INTERNAL MEDICINE

## 2025-07-09 PROCEDURE — 99213 OFFICE O/P EST LOW 20 MIN: CPT | Performed by: INTERNAL MEDICINE

## 2025-07-09 RX ORDER — ROSUVASTATIN CALCIUM 5 MG/1
5 TABLET, COATED ORAL EVERY OTHER DAY
Qty: 45 TABLET | Refills: 3 | Status: SHIPPED | OUTPATIENT
Start: 2025-07-09

## 2025-07-09 RX ORDER — LANOLIN ALCOHOL/MO/W.PET/CERES
1000 CREAM (GRAM) TOPICAL DAILY
COMMUNITY

## 2025-07-09 NOTE — ASSESSMENT & PLAN NOTE
Recently resolved on vitamin D supplement vitamin D level 35 advised to continue same supplement

## 2025-07-09 NOTE — PATIENT INSTRUCTIONS
Patient was advised to continue present medications. discussed with the patient medications and laboratory data in detail.  Follow-up with me in 6 months or as advised.  If any blood test was ordered please do 1 week prior to next appointment unless advise to get earlier.  If you have any questions please call the office [phone]

## 2025-07-09 NOTE — ASSESSMENT & PLAN NOTE
Vitamin B12 deficiency resolved on vitamin B12 supplement.  Vitamin B12 525 advised to continue same supplement  Orders:  •  CBC and differential; Future

## 2025-07-09 NOTE — ASSESSMENT & PLAN NOTE
Diabetes mellitus well-controlled.  Patient denies any hypoglycemia symptoms.  He was seen by recently eye doctor.  No problems with his feet.  Advised to continue present medication and 1800 low sugar low-carb diet.  Will follow blood sugar hemoglobin A1c.  Lab Results   Component Value Date    HGBA1C 6.2 (H) 07/01/2025       Orders:  •  Lipid panel; Future  •  CBC and differential; Future  •  Hemoglobin A1C; Future  •  rosuvastatin (CRESTOR) 5 mg tablet; Take 1 tablet (5 mg total) by mouth every other day

## 2025-07-09 NOTE — PROGRESS NOTES
Adult Annual Physical  Name: Claudine Tony      : 1970      MRN: [id number]  Encounter Provider: Coleman Hodges MD  Encounter Date: 2025   Encounter department: [facility] MEDIC[facility]    :  Assessment & Plan  Type 2 diabetes mellitus without complication, without long-term current use of insulin (HCC)  Diabetes mellitus well-controlled.  Patient denies any hypoglycemia symptoms.  He was seen by recently eye doctor.  No problems with his feet.  Advised to continue present medication and 1800 low sugar low-carb diet.  Will follow blood sugar hemoglobin A1c.  Lab Results   Component Value Date    HGBA1C 6.2 (H) 2025       Orders:  •  Lipid panel; Future  •  CBC and differential; Future  •  Hemoglobin A1C; Future  •  rosuvastatin (CRESTOR) 5 mg tablet; Take 1 tablet (5 mg total) by mouth every other day    Elevated LDL cholesterol level  Well-controlled on rosuvastatin.  Cholesterol 150, triglyceride 84, HDL 44, LDL 54 advised to continue present medication rosuvastatin as prescribed and low-cholesterol low-carb diet.  Will follow lipid panel.  Orders:  •  Lipid panel; Future  •  rosuvastatin (CRESTOR) 5 mg tablet; Take 1 tablet (5 mg total) by mouth every other day    Annual physical exam    Orders:  •  Lipid panel; Future  •  CBC and differential; Future  •  Hemoglobin A1C; Future    Vitamin B12 deficiency  Vitamin B12 deficiency resolved on vitamin B12 supplement.  Vitamin B12 525 advised to continue same supplement  Orders:  •  CBC and differential; Future    Vitamin D deficiency  Recently resolved on vitamin D supplement vitamin D level 35 advised to continue same supplement       BMI 24.0-24.9, adult         High risk medication use         Screening PSA (prostate specific antigen)    Orders:  •  PSA, Total Screen; Future    Screening for colon cancer    Orders:  •  Ambulatory Referral to Gastroenterology; Future        Preventive Screenings:    -  Prostate cancer screening: screening up-to-date     Immunizations:  - Immunizations due: Prevnar 20, Tdap and Zoster (Shingrix)         History of Present Illness   55-year-old male patient presents for annual physical examination as well as follow-up of his medical problems.    Current Medications[1]     Past Medical History[2]         Adult Annual Physical:  Patient presents for annual physical.     Diet and Physical Activity:  - Diet/Nutrition: well balanced diet, low fat diet and low carb diet.  - Exercise: walking and 3-4 times a week on average.    Depression Screening:  - PHQ-2 Score: 0    General Health:  - Sleep: sleeps well.  - Hearing: normal hearing bilateral ears.  - Vision: no vision problems and wears glasses.  - Dental: regular dental visits.     Health:  - History of STDs: no.   - Urinary symptoms: none.     Advanced Care Planning:  - Has an advanced directive?: no    - Has a durable medical POA?: no    - ACP document given to patient?: yes      Review of Systems   Constitutional:  Negative for chills, fatigue and fever.   HENT:  Negative for congestion, ear pain, hearing loss, nosebleeds, sinus pain, sore throat and trouble swallowing.    Eyes:  Negative for discharge, redness and visual disturbance.   Respiratory:  Negative for cough, chest tightness and shortness of breath.    Cardiovascular:  Negative for chest pain and palpitations.   Gastrointestinal:  Negative for abdominal pain, blood in stool, constipation, diarrhea, nausea and vomiting.   Genitourinary:  Negative for dysuria, flank pain, frequency and hematuria.   Musculoskeletal:  Negative for arthralgias, myalgias and neck pain.   Skin:  Negative for color change and rash.   Neurological:  Negative for dizziness, speech difficulty, weakness and headaches.   Hematological:  Does not bruise/bleed easily.   Psychiatric/Behavioral:  Negative for agitation and behavioral problems.        Pertinent Medical History     As above.      Medical  "History Reviewed by provider this encounter:  Tobacco  Allergies  Meds  Problems  Med Hx  Surg Hx  Fam Hx     .  Medications Ordered Prior to Encounter[3]   Social History[4]    Objective   /64 (BP Location: Left arm, Patient Position: Sitting, Cuff Size: Standard)   Pulse 79   Temp 98 °F (36.7 °C) (Temporal)   Resp 16   Ht 5' 3\" (1.6 m)   Wt 64 kg (141 lb)   SpO2 99%   BMI 24.98 kg/m²     Physical Exam  Vitals and nursing note reviewed.   Constitutional:       General: He is not in acute distress.     Appearance: Normal appearance. He is well-developed.   HENT:      Head: Normocephalic and atraumatic.      Right Ear: Tympanic membrane, ear canal and external ear normal. There is no impacted cerumen.      Left Ear: Tympanic membrane, ear canal and external ear normal. There is no impacted cerumen.      Nose: Nose normal.      Mouth/Throat:      Mouth: Mucous membranes are moist.      Pharynx: Oropharynx is clear.     Eyes:      General: No scleral icterus.        Right eye: No discharge.         Left eye: No discharge.      Extraocular Movements: Extraocular movements intact.      Conjunctiva/sclera: Conjunctivae normal.       Cardiovascular:      Rate and Rhythm: Normal rate and regular rhythm.      Pulses: Normal pulses.      Heart sounds: Normal heart sounds. No murmur heard.  Pulmonary:      Effort: Pulmonary effort is normal. No respiratory distress.      Breath sounds: Normal breath sounds. No wheezing, rhonchi or rales.   Abdominal:      General: There is no distension.      Palpations: Abdomen is soft.      Tenderness: There is no abdominal tenderness.     Musculoskeletal:         General: No swelling. Normal range of motion.      Cervical back: Normal range of motion and neck supple.      Right lower leg: No edema.      Left lower leg: No edema.     Skin:     General: Skin is warm and dry.      Capillary Refill: Capillary refill takes less than 2 seconds.      Findings: No rash. "     Neurological:      General: No focal deficit present.      Mental Status: He is alert and oriented to person, place, and time.      Motor: No weakness.      Coordination: Coordination normal.     Psychiatric:         Mood and Affect: Mood normal.         Behavior: Behavior normal.         Thought Content: Thought content normal.         Judgment: Judgment normal.                [1]    Current Outpatient Medications:   •  metFORMIN (GLUCOPHAGE) 500 mg tablet, Take 500 mg by mouth in the morning, Disp: , Rfl:   •  rosuvastatin (CRESTOR) 5 mg tablet, Take 1 tablet (5 mg total) by mouth every other day, Disp: 45 tablet, Rfl: 3  •  vitamin B-12 (VITAMIN B-12) 1,000 mcg tablet, Take 1,000 mcg by mouth daily, Disp: , Rfl:   •  Vitamin D, Cholecalciferol, 25 MCG (1000 UT) CAPS, Take 2 tablets by mouth Daily at 2am, Disp: , Rfl: [2]  Past Medical History:  Diagnosis Date   • Annual physical exam 07/09/2025   • BMI 24.0-24.9, adult 07/09/2025   • BMI 27.0-27.9,adult 04/08/2024   • Elevated LDL cholesterol level 04/08/2024   • Low vitamin B12 level 04/08/2024   • Type 2 diabetes mellitus without complication, without long-term current use of insulin (HCC) 04/08/2024   • Upper back pain on right side 07/16/2024   • Visit for preventive health examination 04/08/2024   • Vitamin B12 deficiency 01/16/2025   • Vitamin D deficiency 04/08/2024   [3]  Current Outpatient Medications on File Prior to Visit   Medication Sig Dispense Refill   • metFORMIN (GLUCOPHAGE) 500 mg tablet Take 500 mg by mouth in the morning     • vitamin B-12 (VITAMIN B-12) 1,000 mcg tablet Take 1,000 mcg by mouth daily     • Vitamin D, Cholecalciferol, 25 MCG (1000 UT) CAPS Take 2 tablets by mouth Daily at 2am     • [DISCONTINUED] NON FORMULARY Take 50 mg by mouth in the morning. Vildagliptin.     • [DISCONTINUED] rosuvastatin (CRESTOR) 5 mg tablet Take 1 tablet (5 mg total) by mouth every other day 45 tablet 3     No current facility-administered  medications on file prior to visit.   [4]  Social History  Tobacco Use   • Smoking status: Never   • Smokeless tobacco: Never   Vaping Use   • Vaping status: Never Used   Substance and Sexual Activity   • Alcohol use: Not Currently   • Drug use: Not Currently

## 2025-07-09 NOTE — ASSESSMENT & PLAN NOTE
Well-controlled on rosuvastatin.  Cholesterol 150, triglyceride 84, HDL 44, LDL 54 advised to continue present medication rosuvastatin as prescribed and low-cholesterol low-carb diet.  Will follow lipid panel.  Orders:  •  Lipid panel; Future  •  rosuvastatin (CRESTOR) 5 mg tablet; Take 1 tablet (5 mg total) by mouth every other day

## 2025-07-16 DIAGNOSIS — J01.00 ACUTE NON-RECURRENT MAXILLARY SINUSITIS: Primary | ICD-10-CM

## 2025-07-16 RX ORDER — CEFUROXIME AXETIL 250 MG/1
250 TABLET ORAL EVERY 12 HOURS SCHEDULED
Qty: 20 TABLET | Refills: 0 | Status: SHIPPED | OUTPATIENT
Start: 2025-07-16 | End: 2025-07-26

## 2025-08-03 DIAGNOSIS — J01.00 ACUTE MAXILLARY SINUSITIS, RECURRENCE NOT SPECIFIED: Primary | ICD-10-CM
